# Patient Record
Sex: FEMALE | Race: WHITE | NOT HISPANIC OR LATINO
[De-identification: names, ages, dates, MRNs, and addresses within clinical notes are randomized per-mention and may not be internally consistent; named-entity substitution may affect disease eponyms.]

---

## 2017-03-16 ENCOUNTER — APPOINTMENT (OUTPATIENT)
Dept: SURGERY | Facility: CLINIC | Age: 82
End: 2017-03-16

## 2017-07-06 ENCOUNTER — APPOINTMENT (OUTPATIENT)
Dept: GASTROENTEROLOGY | Facility: CLINIC | Age: 82
End: 2017-07-06

## 2017-07-07 ENCOUNTER — OTHER (OUTPATIENT)
Age: 82
End: 2017-07-07

## 2017-08-15 ENCOUNTER — APPOINTMENT (OUTPATIENT)
Dept: SURGICAL ONCOLOGY | Facility: CLINIC | Age: 82
End: 2017-08-15
Payer: MEDICARE

## 2017-08-15 VITALS
DIASTOLIC BLOOD PRESSURE: 76 MMHG | OXYGEN SATURATION: 100 % | HEART RATE: 66 BPM | HEIGHT: 60 IN | SYSTOLIC BLOOD PRESSURE: 169 MMHG | RESPIRATION RATE: 15 BRPM | BODY MASS INDEX: 20.03 KG/M2 | WEIGHT: 102 LBS

## 2017-08-15 PROCEDURE — 99214 OFFICE O/P EST MOD 30 MIN: CPT

## 2017-09-26 ENCOUNTER — APPOINTMENT (OUTPATIENT)
Dept: SURGERY | Facility: CLINIC | Age: 82
End: 2017-09-26
Payer: MEDICARE

## 2017-09-26 PROCEDURE — 99213 OFFICE O/P EST LOW 20 MIN: CPT

## 2017-10-02 ENCOUNTER — RESULT REVIEW (OUTPATIENT)
Age: 82
End: 2017-10-02

## 2018-03-22 ENCOUNTER — OTHER (OUTPATIENT)
Age: 83
End: 2018-03-22

## 2018-05-08 ENCOUNTER — APPOINTMENT (OUTPATIENT)
Dept: SURGERY | Facility: CLINIC | Age: 83
End: 2018-05-08
Payer: MEDICARE

## 2018-05-08 PROCEDURE — 99213 OFFICE O/P EST LOW 20 MIN: CPT

## 2018-06-18 ENCOUNTER — RESULT REVIEW (OUTPATIENT)
Age: 83
End: 2018-06-18

## 2018-07-10 ENCOUNTER — APPOINTMENT (OUTPATIENT)
Dept: GASTROENTEROLOGY | Facility: CLINIC | Age: 83
End: 2018-07-10
Payer: MEDICARE

## 2018-07-10 VITALS
DIASTOLIC BLOOD PRESSURE: 49 MMHG | BODY MASS INDEX: 20.03 KG/M2 | WEIGHT: 102 LBS | HEART RATE: 47 BPM | SYSTOLIC BLOOD PRESSURE: 132 MMHG | HEIGHT: 60 IN

## 2018-07-10 DIAGNOSIS — Z87.19 PERSONAL HISTORY OF OTHER DISEASES OF THE DIGESTIVE SYSTEM: ICD-10-CM

## 2018-07-10 DIAGNOSIS — M19.90 UNSPECIFIED OSTEOARTHRITIS, UNSPECIFIED SITE: ICD-10-CM

## 2018-07-10 DIAGNOSIS — E78.00 PURE HYPERCHOLESTEROLEMIA, UNSPECIFIED: ICD-10-CM

## 2018-07-10 DIAGNOSIS — R73.03 PREDIABETES.: ICD-10-CM

## 2018-07-10 PROCEDURE — 82274 ASSAY TEST FOR BLOOD FECAL: CPT | Mod: QW

## 2018-07-10 PROCEDURE — 99214 OFFICE O/P EST MOD 30 MIN: CPT

## 2018-07-10 RX ORDER — ROSUVASTATIN CALCIUM 10 MG/1
10 TABLET, FILM COATED ORAL
Refills: 0 | Status: ACTIVE | COMMUNITY

## 2018-07-17 ENCOUNTER — RESULT REVIEW (OUTPATIENT)
Age: 83
End: 2018-07-17

## 2018-08-21 ENCOUNTER — APPOINTMENT (OUTPATIENT)
Dept: SURGICAL ONCOLOGY | Facility: CLINIC | Age: 83
End: 2018-08-21
Payer: MEDICARE

## 2018-08-21 VITALS
BODY MASS INDEX: 20.03 KG/M2 | OXYGEN SATURATION: 97 % | DIASTOLIC BLOOD PRESSURE: 82 MMHG | HEIGHT: 60 IN | WEIGHT: 102 LBS | SYSTOLIC BLOOD PRESSURE: 159 MMHG | RESPIRATION RATE: 16 BRPM | HEART RATE: 58 BPM

## 2018-08-21 PROCEDURE — 99213 OFFICE O/P EST LOW 20 MIN: CPT

## 2018-09-20 ENCOUNTER — APPOINTMENT (OUTPATIENT)
Dept: OTOLARYNGOLOGY | Facility: CLINIC | Age: 83
End: 2018-09-20
Payer: MEDICARE

## 2018-09-20 VITALS
WEIGHT: 102 LBS | HEART RATE: 54 BPM | BODY MASS INDEX: 20.03 KG/M2 | HEIGHT: 60 IN | SYSTOLIC BLOOD PRESSURE: 152 MMHG | DIASTOLIC BLOOD PRESSURE: 79 MMHG

## 2018-09-20 DIAGNOSIS — R68.89 OTHER GENERAL SYMPTOMS AND SIGNS: ICD-10-CM

## 2018-09-20 PROCEDURE — 99214 OFFICE O/P EST MOD 30 MIN: CPT | Mod: 25

## 2018-09-20 PROCEDURE — 31575 DIAGNOSTIC LARYNGOSCOPY: CPT

## 2018-10-01 ENCOUNTER — RX CHANGE (OUTPATIENT)
Age: 83
End: 2018-10-01

## 2018-10-10 ENCOUNTER — NON-APPOINTMENT (OUTPATIENT)
Age: 83
End: 2018-10-10

## 2018-10-10 ENCOUNTER — APPOINTMENT (OUTPATIENT)
Dept: ELECTROPHYSIOLOGY | Facility: CLINIC | Age: 83
End: 2018-10-10
Payer: MEDICARE

## 2018-10-10 VITALS
HEIGHT: 60 IN | DIASTOLIC BLOOD PRESSURE: 68 MMHG | OXYGEN SATURATION: 99 % | BODY MASS INDEX: 20.03 KG/M2 | HEART RATE: 65 BPM | WEIGHT: 102 LBS | SYSTOLIC BLOOD PRESSURE: 149 MMHG

## 2018-10-10 DIAGNOSIS — I10 ESSENTIAL (PRIMARY) HYPERTENSION: ICD-10-CM

## 2018-10-10 PROCEDURE — 93000 ELECTROCARDIOGRAM COMPLETE: CPT

## 2018-10-10 PROCEDURE — 99205 OFFICE O/P NEW HI 60 MIN: CPT

## 2018-10-10 RX ORDER — OMEPRAZOLE MAGNESIUM 20 MG/1
20 TABLET, DELAYED RELEASE ORAL
Qty: 30 | Refills: 3 | Status: DISCONTINUED | COMMUNITY
Start: 2018-09-20 | End: 2018-10-10

## 2018-10-10 RX ORDER — LOSARTAN POTASSIUM 50 MG/1
50 TABLET, FILM COATED ORAL DAILY
Qty: 30 | Refills: 0 | Status: ACTIVE | COMMUNITY

## 2018-10-10 RX ORDER — METHYLPREDNISOLONE 4 MG/1
4 TABLET ORAL
Qty: 1 | Refills: 0 | Status: DISCONTINUED | COMMUNITY
Start: 2018-09-20 | End: 2018-10-10

## 2018-10-18 ENCOUNTER — APPOINTMENT (OUTPATIENT)
Dept: OTOLARYNGOLOGY | Facility: CLINIC | Age: 83
End: 2018-10-18
Payer: MEDICARE

## 2018-10-18 VITALS
DIASTOLIC BLOOD PRESSURE: 71 MMHG | WEIGHT: 102 LBS | HEIGHT: 60 IN | BODY MASS INDEX: 20.03 KG/M2 | SYSTOLIC BLOOD PRESSURE: 142 MMHG | HEART RATE: 66 BPM

## 2018-10-18 PROCEDURE — 69210 REMOVE IMPACTED EAR WAX UNI: CPT

## 2018-10-18 PROCEDURE — 31575 DIAGNOSTIC LARYNGOSCOPY: CPT

## 2018-10-18 PROCEDURE — 99214 OFFICE O/P EST MOD 30 MIN: CPT | Mod: 25

## 2018-10-19 ENCOUNTER — RESULT REVIEW (OUTPATIENT)
Age: 83
End: 2018-10-19

## 2018-10-23 ENCOUNTER — OUTPATIENT (OUTPATIENT)
Dept: OUTPATIENT SERVICES | Facility: HOSPITAL | Age: 83
LOS: 1 days | End: 2018-10-23
Payer: MEDICARE

## 2018-10-23 VITALS
HEIGHT: 60 IN | WEIGHT: 102.07 LBS | TEMPERATURE: 98 F | RESPIRATION RATE: 16 BRPM | OXYGEN SATURATION: 98 % | DIASTOLIC BLOOD PRESSURE: 63 MMHG | HEART RATE: 63 BPM | SYSTOLIC BLOOD PRESSURE: 143 MMHG

## 2018-10-23 DIAGNOSIS — Z87.19 PERSONAL HISTORY OF OTHER DISEASES OF THE DIGESTIVE SYSTEM: Chronic | ICD-10-CM

## 2018-10-23 DIAGNOSIS — I49.9 CARDIAC ARRHYTHMIA, UNSPECIFIED: ICD-10-CM

## 2018-10-23 DIAGNOSIS — I65.29 OCCLUSION AND STENOSIS OF UNSPECIFIED CAROTID ARTERY: Chronic | ICD-10-CM

## 2018-10-23 DIAGNOSIS — H26.40 UNSPECIFIED SECONDARY CATARACT: Chronic | ICD-10-CM

## 2018-10-23 DIAGNOSIS — K11.9 DISEASE OF SALIVARY GLAND, UNSPECIFIED: Chronic | ICD-10-CM

## 2018-10-23 PROCEDURE — 93005 ELECTROCARDIOGRAM TRACING: CPT

## 2018-10-23 PROCEDURE — 93010 ELECTROCARDIOGRAM REPORT: CPT

## 2018-10-23 PROCEDURE — 33282: CPT

## 2018-10-23 PROCEDURE — 99203 OFFICE O/P NEW LOW 30 MIN: CPT

## 2018-10-23 PROCEDURE — C1764: CPT

## 2018-10-23 RX ORDER — LANSOPRAZOLE 15 MG/1
1 CAPSULE, DELAYED RELEASE ORAL
Qty: 0 | Refills: 0 | COMMUNITY

## 2018-10-23 RX ORDER — ROSUVASTATIN CALCIUM 5 MG/1
1 TABLET ORAL
Qty: 0 | Refills: 0 | COMMUNITY

## 2018-10-23 RX ORDER — LOSARTAN POTASSIUM 100 MG/1
1 TABLET, FILM COATED ORAL
Qty: 0 | Refills: 0 | COMMUNITY

## 2018-10-23 RX ORDER — SODIUM CHLORIDE 9 MG/ML
3 INJECTION INTRAMUSCULAR; INTRAVENOUS; SUBCUTANEOUS EVERY 8 HOURS
Qty: 0 | Refills: 0 | Status: DISCONTINUED | OUTPATIENT
Start: 2018-10-23 | End: 2018-11-07

## 2018-10-23 NOTE — H&P CARDIOLOGY - PMH
Carotid stenosis  s/p CEA  Diabetes  pre DM  GERD (gastroesophageal reflux disease)    HLD (hyperlipidemia)    HTN (hypertension)    Parotid mass  surgery + RT  Ventricular arrhythmia

## 2018-10-23 NOTE — H&P CARDIOLOGY - HISTORY OF PRESENT ILLNESS
86 year old  female with PMH of carotid stenosis s/p CEA (right), Parotid adenocarcinoma s/p resection,  GERD, HTN, HLD, pre DM, and ventricular arrhythmia presents today for LOOP implant. 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 Evaluated by Dr Ramirez and  LOOP recommended. No CP, dizziness or syncope reported. + MALDONADO.

## 2018-10-23 NOTE — H&P CARDIOLOGY - FAMILY HISTORY
Sibling  Still living? Yes, Estimated age: Age Unknown  Family history of arrhythmia, Age at diagnosis: Age Unknown

## 2018-10-26 PROBLEM — E11.9 TYPE 2 DIABETES MELLITUS WITHOUT COMPLICATIONS: Chronic | Status: ACTIVE | Noted: 2018-10-23

## 2018-10-26 PROBLEM — K11.9 DISEASE OF SALIVARY GLAND, UNSPECIFIED: Chronic | Status: ACTIVE | Noted: 2018-10-23

## 2018-10-26 PROBLEM — I10 ESSENTIAL (PRIMARY) HYPERTENSION: Chronic | Status: ACTIVE | Noted: 2018-10-23

## 2018-10-26 PROBLEM — I65.29 OCCLUSION AND STENOSIS OF UNSPECIFIED CAROTID ARTERY: Chronic | Status: ACTIVE | Noted: 2018-10-23

## 2018-10-26 PROBLEM — E78.5 HYPERLIPIDEMIA, UNSPECIFIED: Chronic | Status: ACTIVE | Noted: 2018-10-23

## 2018-10-26 PROBLEM — K21.9 GASTRO-ESOPHAGEAL REFLUX DISEASE WITHOUT ESOPHAGITIS: Chronic | Status: ACTIVE | Noted: 2018-10-23

## 2018-10-26 PROBLEM — I49.9 CARDIAC ARRHYTHMIA, UNSPECIFIED: Chronic | Status: ACTIVE | Noted: 2018-10-23

## 2018-10-31 ENCOUNTER — APPOINTMENT (OUTPATIENT)
Dept: ELECTROPHYSIOLOGY | Facility: CLINIC | Age: 83
End: 2018-10-31
Payer: MEDICARE

## 2018-10-31 VITALS
DIASTOLIC BLOOD PRESSURE: 79 MMHG | HEART RATE: 55 BPM | WEIGHT: 100 LBS | OXYGEN SATURATION: 99 % | SYSTOLIC BLOOD PRESSURE: 126 MMHG | BODY MASS INDEX: 19.53 KG/M2

## 2018-10-31 DIAGNOSIS — I49.3 VENTRICULAR PREMATURE DEPOLARIZATION: ICD-10-CM

## 2018-10-31 DIAGNOSIS — Z98.890 OTHER SPECIFIED POSTPROCEDURAL STATES: ICD-10-CM

## 2018-10-31 PROCEDURE — 99024 POSTOP FOLLOW-UP VISIT: CPT

## 2018-11-12 PROBLEM — I10 HYPERTENSION, UNSPECIFIED TYPE: Status: ACTIVE | Noted: 2018-07-10

## 2018-11-16 ENCOUNTER — APPOINTMENT (OUTPATIENT)
Dept: GASTROENTEROLOGY | Facility: CLINIC | Age: 83
End: 2018-11-16
Payer: MEDICARE

## 2018-11-16 VITALS
HEIGHT: 60 IN | BODY MASS INDEX: 19.63 KG/M2 | WEIGHT: 100 LBS | DIASTOLIC BLOOD PRESSURE: 78 MMHG | SYSTOLIC BLOOD PRESSURE: 151 MMHG | HEART RATE: 61 BPM

## 2018-11-16 DIAGNOSIS — Z80.0 FAMILY HISTORY OF MALIGNANT NEOPLASM OF DIGESTIVE ORGANS: ICD-10-CM

## 2018-11-16 PROCEDURE — 99214 OFFICE O/P EST MOD 30 MIN: CPT

## 2018-11-16 RX ORDER — LANSOPRAZOLE 30 MG/1
30 CAPSULE, DELAYED RELEASE ORAL DAILY
Qty: 30 | Refills: 0 | Status: DISCONTINUED | COMMUNITY
Start: 2018-09-21 | End: 2018-11-16

## 2018-11-19 ENCOUNTER — APPOINTMENT (OUTPATIENT)
Dept: GASTROENTEROLOGY | Facility: CLINIC | Age: 83
End: 2018-11-19

## 2018-12-12 ENCOUNTER — APPOINTMENT (OUTPATIENT)
Dept: GASTROENTEROLOGY | Facility: CLINIC | Age: 83
End: 2018-12-12

## 2018-12-17 ENCOUNTER — APPOINTMENT (OUTPATIENT)
Dept: ELECTROPHYSIOLOGY | Facility: CLINIC | Age: 83
End: 2018-12-17

## 2019-01-22 ENCOUNTER — APPOINTMENT (OUTPATIENT)
Dept: SURGERY | Facility: CLINIC | Age: 84
End: 2019-01-22
Payer: MEDICARE

## 2019-01-22 PROCEDURE — 99213 OFFICE O/P EST LOW 20 MIN: CPT

## 2019-01-22 NOTE — HISTORY OF PRESENT ILLNESS
[de-identified] : 16  years s/p right parotidectomy for adenocarcinoma with postop RT. denies pain, bleeding, dysphagia, hoarseness or new lesions.    recovered from right  surgery.  last CXR normal

## 2019-01-22 NOTE — ASSESSMENT
[FreeTextEntry1] : will observe.  to return earlier if any change.  no indication for any studies at this time

## 2019-01-22 NOTE — PHYSICAL EXAM
[de-identified] : no palpable thyroid nodules [Laryngoscopy Performed] : laryngoscopy was performed, see procedure section for findings [Midline] : located in midline position [Normal] : orientation to person, place, and time: normal [de-identified] : no palpable parotid bed nodules

## 2019-02-01 ENCOUNTER — APPOINTMENT (OUTPATIENT)
Dept: OTOLARYNGOLOGY | Facility: CLINIC | Age: 84
End: 2019-02-01
Payer: MEDICARE

## 2019-02-01 VITALS
BODY MASS INDEX: 19.63 KG/M2 | DIASTOLIC BLOOD PRESSURE: 63 MMHG | SYSTOLIC BLOOD PRESSURE: 153 MMHG | HEIGHT: 60 IN | WEIGHT: 100 LBS | HEART RATE: 42 BPM

## 2019-02-01 DIAGNOSIS — H90.3 SENSORINEURAL HEARING LOSS, BILATERAL: ICD-10-CM

## 2019-02-01 DIAGNOSIS — H61.23 IMPACTED CERUMEN, BILATERAL: ICD-10-CM

## 2019-02-01 DIAGNOSIS — M26.609 UNSPECIFIED TEMPOROMANDIBULAR JOINT DISORDER: ICD-10-CM

## 2019-02-01 PROCEDURE — 99214 OFFICE O/P EST MOD 30 MIN: CPT | Mod: 25

## 2019-02-01 PROCEDURE — 31575 DIAGNOSTIC LARYNGOSCOPY: CPT

## 2019-02-01 PROCEDURE — 69210 REMOVE IMPACTED EAR WAX UNI: CPT

## 2019-02-01 NOTE — REASON FOR VISIT
[Subsequent Evaluation] : a subsequent evaluation for [Hoarseness/Dysphonia] : hoarseness [Spouse] : spouse [FreeTextEntry2] : hoarseness

## 2019-02-01 NOTE — ASSESSMENT
[FreeTextEntry1] :  GERD\par anti-reflux  diet\par \par After informed verbal consent is obtained, cerumen is removed from the right and left  ear canal with a curette and suction.\par Rec: \par Debrox was prescribed and  is to be placed in both ears on a routine basis to keep them free of wax.\par Routine debridement suggested to keep the ears free of wax.\par

## 2019-02-01 NOTE — PHYSICAL EXAM
[Midline] : trachea located in midline position [Normal] : no rashes [Laryngoscopy Performed] : laryngoscopy was performed, see procedure section for findings [de-identified] : bilat wax

## 2019-02-01 NOTE — HISTORY OF PRESENT ILLNESS
[Anxiety] : anxiety [Hearing Loss] : hearing loss [Presbycusis] : presbycusis [None] : No associated symptoms are reported. [de-identified] : 85 yo female\par Patient has been doing the relfux medication  and now presents for f/u\par voice now ok\par h/o ear wax amd moderate hearing loss x yrs [de-identified] : parotid surg [Tinnitus] : no tinnitus [Vertigo] : no vertigo [Dizziness] : no dizziness [Headache] : no headache [Loud Noise Exposure] : no history of loud noise exposure [Smoking] : no smoking [Early Onset Hearing Loss] : no early onset hearing loss [Stroke] : no stroke [Facial Pain] : no facial pain [Facial Pressure] : no facial pressure [Nasal Congestion] : no nasal congestion [Diplopia] : no diplopia [Ear Fullness] : no ear fullness [Allergic Rhinitis] : no allergic rhinitis [Environmental Allergies] : no environmental allergies [Seasonal Allergies] : no seasonal allergies [Adenoidectomy] : no adenoidectomy [Allergies] : no allergies [Asthma] : no asthma [Neck Mass] : no neck mass [Neck Pain] : no neck pain [Chills] : no chills [Cold Intolerance] : no cold intolerance [Cough] : no cough [Fatigue] : no fatigue [Heat Intolerance] : no heat intolerance

## 2019-05-07 ENCOUNTER — APPOINTMENT (OUTPATIENT)
Dept: ELECTROPHYSIOLOGY | Facility: CLINIC | Age: 84
End: 2019-05-07

## 2019-07-11 ENCOUNTER — APPOINTMENT (OUTPATIENT)
Dept: GASTROENTEROLOGY | Facility: CLINIC | Age: 84
End: 2019-07-11
Payer: MEDICARE

## 2019-07-11 VITALS
BODY MASS INDEX: 17.87 KG/M2 | WEIGHT: 91 LBS | HEART RATE: 52 BPM | SYSTOLIC BLOOD PRESSURE: 102 MMHG | HEIGHT: 60 IN | DIASTOLIC BLOOD PRESSURE: 50 MMHG

## 2019-07-11 DIAGNOSIS — E80.6 OTHER DISORDERS OF BILIRUBIN METABOLISM: ICD-10-CM

## 2019-07-11 DIAGNOSIS — M41.9 SCOLIOSIS, UNSPECIFIED: ICD-10-CM

## 2019-07-11 DIAGNOSIS — K21.9 GASTRO-ESOPHAGEAL REFLUX DISEASE W/OUT ESOPHAGITIS: ICD-10-CM

## 2019-07-11 PROCEDURE — 99214 OFFICE O/P EST MOD 30 MIN: CPT

## 2019-07-11 PROCEDURE — 82274 ASSAY TEST FOR BLOOD FECAL: CPT | Mod: QW

## 2019-07-11 RX ORDER — LANSOPRAZOLE 30 MG/1
30 CAPSULE, DELAYED RELEASE ORAL DAILY
Qty: 90 | Refills: 3 | Status: DISCONTINUED | COMMUNITY
Start: 2018-10-18 | End: 2019-07-11

## 2019-07-11 RX ORDER — LANSOPRAZOLE 30 MG/1
30 CAPSULE, DELAYED RELEASE ORAL
Qty: 180 | Refills: 3 | Status: DISCONTINUED | COMMUNITY
Start: 2018-11-16 | End: 2019-07-11

## 2019-07-11 NOTE — ASSESSMENT
[FreeTextEntry1] : 1. History of GERD, laryngopharyngeal reflux, now quiescent after PPI; mild reflux esophagitis, hiatal hernia, inactive gastritis at last EGD 2010; family history of stomach cancer (brother).\par 2. Tortuous colon, diverticulosis, hemorrhoids at last colonoscopy May 2010. Stool guaiac negative with negative FIT on today's exam.\par 3. Status post parotidectomy for adenocarcinoma, with post-op radiation.\par 4. Hyperbilirubinemia secondary to Gilbert's.\par 5. Arrhythmia; s/p loop insertion. Exertional dyspnea. \par 6. Prediabetes.\par 7. Osteoarthritis, DJD of spine with scoliosis.\par 8. Macular degeneration; status post right iridectomy.\par 9. Status post right carotid endarterectomy.\par 10. Hypertension.\par 11. Hypercholesterolemia.\par 12. Status post right inguinal herniorrhaphy, T&A.\par 13. Allergic to penicillin.\par \par Plan:\par 1. Recent labs reviewed.\par 2. No plans for repeat scopes at this time.\par 3. Return here in 1 year or as needed.\par

## 2019-07-11 NOTE — PHYSICAL EXAM
[General Appearance - Alert] : alert [General Appearance - In No Acute Distress] : in no acute distress [Sclera] : the sclera and conjunctiva were normal [Outer Ear] : the ears and nose were normal in appearance [Neck Appearance] : the appearance of the neck was normal [Neck Cervical Mass (___cm)] : no neck mass was observed [Thyroid Diffuse Enlargement] : the thyroid was not enlarged [Jugular Venous Distention Increased] : there was no jugular-venous distention [Thyroid Nodule] : there were no palpable thyroid nodules [Auscultation Breath Sounds / Voice Sounds] : lungs were clear to auscultation bilaterally [Heart Sounds] : normal S1 and S2 [Heart Sounds Gallop] : no gallops [Heart Sounds Pericardial Friction Rub] : no pericardial rub [Edema] : there was no peripheral edema [Full Pulse] : the pedal pulses are present [Bowel Sounds] : normal bowel sounds [Abdomen Soft] : soft [Abdomen Tenderness] : non-tender [Abdomen Mass (___ Cm)] : no abdominal mass palpated [Abdomen Hernia] : no hernia was discovered [No Rectal Mass] : no rectal mass [Normal Sphincter Tone] : normal sphincter tone [Cervical Lymph Nodes Enlarged Posterior Bilaterally] : posterior cervical [Cervical Lymph Nodes Enlarged Anterior Bilaterally] : anterior cervical [Supraclavicular Lymph Nodes Enlarged Bilaterally] : supraclavicular [Axillary Lymph Nodes Enlarged Bilaterally] : axillary [Femoral Lymph Nodes Enlarged Bilaterally] : femoral [Inguinal Lymph Nodes Enlarged Bilaterally] : inguinal [Abnormal Walk] : normal gait [Nail Clubbing] : no clubbing  or cyanosis of the fingernails [Musculoskeletal - Swelling] : no joint swelling seen [Motor Tone] : muscle strength and tone were normal [Skin Turgor] : normal skin turgor [Skin Color & Pigmentation] : normal skin color and pigmentation [] : no rash [Oriented To Time, Place, And Person] : oriented to person, place, and time [Impaired Insight] : insight and judgment were intact [Affect] : the affect was normal [Internal Hemorrhoid] : no internal hemorrhoids [External Hemorrhoid] : no external hemorrhoids [Occult Blood Positive] : stool was negative for occult blood [FreeTextEntry1] : scoliosis

## 2019-07-11 NOTE — CONSULT LETTER
[Dear  ___] : Dear  [unfilled], [Courtesy Letter:] : I had the pleasure of seeing your patient, [unfilled], in my office today. [Please see my note below.] : Please see my note below. [Consult Closing:] : Thank you very much for allowing me to participate in the care of this patient.  If you have any questions, please do not hesitate to contact me. [Sincerely,] : Sincerely, [FreeTextEntry3] : Ld Henry M.D.\par

## 2019-07-11 NOTE — HISTORY OF PRESENT ILLNESS
[FreeTextEntry1] : Rneetta was diagnosed with laryngopharyngeal reflux (evaluated by Dr. Brennan), took lansoprazole for a while, and now has virtually no GI symptoms, no longer taking a PPI. On only one occasion after ingesting sweets did she have any heartburn. Recent labs revealed elevated bilirubin.

## 2019-08-22 ENCOUNTER — APPOINTMENT (OUTPATIENT)
Dept: SURGICAL ONCOLOGY | Facility: CLINIC | Age: 84
End: 2019-08-22
Payer: MEDICARE

## 2019-08-22 VITALS
HEART RATE: 83 BPM | SYSTOLIC BLOOD PRESSURE: 155 MMHG | OXYGEN SATURATION: 92 % | WEIGHT: 16 LBS | DIASTOLIC BLOOD PRESSURE: 89 MMHG | BODY MASS INDEX: 3.14 KG/M2 | HEIGHT: 60 IN | RESPIRATION RATE: 16 BRPM

## 2019-08-22 PROCEDURE — 99213 OFFICE O/P EST LOW 20 MIN: CPT

## 2019-08-22 NOTE — HISTORY OF PRESENT ILLNESS
[de-identified] : 88 y/o female presents for annual breast exam. Patient also has a history of adenocarcinoma of the parotid.\par \par Today the pt was without any complaints. Denies palpable breast masses, nipple discharge, skin changes, inversion or breast pain. Denies constitutional symptoms\par \par MMG/US 07/15/19: No significant masses, calcifications, or other findings are seen in either breast. There are postoperative findings in both breasts. A loop recorder is present in the medial LT breast. \par BIRADS 1.

## 2019-08-22 NOTE — ASSESSMENT
[FreeTextEntry1] : Imp:\par No evidence of new or recurrent lesions. Breast imaging failed to identify any suspicious lesions. No suspicious lesions on exam\par \par \par Plan:\par Continue yearly surveillance\par \par \par

## 2019-08-22 NOTE — PHYSICAL EXAM
[Normal] : supple, no neck mass and thyroid not enlarged [Normal Neck Lymph Nodes] : normal neck lymph nodes  [Normal Groin Lymph Nodes] : normal groin lymph nodes [Normal] : oriented to person, place and time, with appropriate affect [FreeTextEntry1] : I, Riley Crowell was present for the physical exam\par \par \par \par  [de-identified] : Complete normal breast examination performed supine and upright revealed no palpable masses, nipple discharge, inversion, deviation or enlarge axillary lymph nodes, or supraclavicular lymph nodes [de-identified] : Normal S1, S2. Regular rate and rhythm\par \par  [de-identified] : Clear breath sounds bilaterally, normal respiratory effort\par \par

## 2019-09-12 ENCOUNTER — APPOINTMENT (OUTPATIENT)
Dept: SURGERY | Facility: CLINIC | Age: 84
End: 2019-09-12
Payer: MEDICARE

## 2019-09-12 PROCEDURE — 99213 OFFICE O/P EST LOW 20 MIN: CPT

## 2019-09-12 NOTE — PHYSICAL EXAM
[de-identified] : no palpable thyroid nodules [de-identified] : well healed scar [Laryngoscopy Performed] : laryngoscopy was performed, see procedure section for findings [Midline] : located in midline position [de-identified] : no palpable parotid bed nodules.   well healed scar [Normal] : orientation to person, place, and time: normal

## 2019-09-12 NOTE — HISTORY OF PRESENT ILLNESS
[de-identified] : 17  years s/p right parotidectomy for adenocarcinoma with postop RT. denies pain, bleeding, dysphagia, hoarseness or new lesions.     last CXR normal.  no changes medically since last visit

## 2019-10-22 ENCOUNTER — RESULT REVIEW (OUTPATIENT)
Age: 84
End: 2019-10-22

## 2019-12-11 ENCOUNTER — APPOINTMENT (OUTPATIENT)
Dept: OTOLARYNGOLOGY | Facility: CLINIC | Age: 84
End: 2019-12-11

## 2020-05-08 ENCOUNTER — APPOINTMENT (OUTPATIENT)
Dept: OTOLARYNGOLOGY | Facility: CLINIC | Age: 85
End: 2020-05-08

## 2020-07-16 ENCOUNTER — APPOINTMENT (OUTPATIENT)
Dept: GASTROENTEROLOGY | Facility: CLINIC | Age: 85
End: 2020-07-16
Payer: MEDICARE

## 2020-07-16 VITALS
DIASTOLIC BLOOD PRESSURE: 66 MMHG | TEMPERATURE: 97.1 F | HEART RATE: 85 BPM | WEIGHT: 89 LBS | HEIGHT: 60 IN | BODY MASS INDEX: 17.47 KG/M2 | SYSTOLIC BLOOD PRESSURE: 160 MMHG

## 2020-07-16 DIAGNOSIS — Z87.898 PERSONAL HISTORY OF OTHER SPECIFIED CONDITIONS: ICD-10-CM

## 2020-07-16 DIAGNOSIS — E80.4 GILBERT SYNDROME: ICD-10-CM

## 2020-07-16 PROCEDURE — 82274 ASSAY TEST FOR BLOOD FECAL: CPT | Mod: QW

## 2020-07-16 PROCEDURE — 99214 OFFICE O/P EST MOD 30 MIN: CPT

## 2020-07-16 RX ORDER — LANSOPRAZOLE 30 MG/1
30 CAPSULE, DELAYED RELEASE ORAL TWICE DAILY
Qty: 180 | Refills: 1 | Status: DISCONTINUED | COMMUNITY
Start: 2020-02-18 | End: 2020-07-16

## 2020-07-16 NOTE — ASSESSMENT
[FreeTextEntry1] : 1. History of GERD, laryngopharyngeal reflux, asymptomatic off PPI; mild reflux esophagitis, hiatal hernia, inactive gastritis at last EGD 2010; family history of stomach cancer (brother).\par 2. Tortuous colon, diverticulosis, hemorrhoids at last colonoscopy May 2010. Stool guaiac negative with negative FIT on today's exam.\par 3. Status post parotidectomy for adenocarcinoma, with post-op radiation.\par 4. Hyperbilirubinemia secondary to Gilbert's.\par 5. Arrhythmia; s/p PPM & loop recorder insertion.  \par 6. Prediabetes.\par 7. Osteoarthritis, DJD of spine with scoliosis.\par 8. Macular degeneration; status post right iridectomy.\par 9. Status post right carotid endarterectomy.\par 10. Hypertension.\par 11. Hypercholesterolemia.\par 12. Status post right inguinal herniorrhaphy, T&A.\par 13. Allergic to penicillin.\par \par Plan:\par 1. Dr. Sigala to forward latest labs for my review.\par 2. Return here in 1 year.\par 3. No plans for panendoscopy at this time.

## 2020-07-16 NOTE — HISTORY OF PRESENT ILLNESS
[FreeTextEntry1] : Renetta denies GI symptoms at this time, no longer taking PPI.  Since last visit, she underwent PPM insertion.

## 2020-07-16 NOTE — PHYSICAL EXAM
[General Appearance - Alert] : alert [General Appearance - In No Acute Distress] : in no acute distress [General Appearance - Well Developed] : well developed [Sclera] : the sclera and conjunctiva were normal [Neck Cervical Mass (___cm)] : no neck mass was observed [Jugular Venous Distention Increased] : there was no jugular-venous distention [Neck Appearance] : the appearance of the neck was normal [Thyroid Nodule] : there were no palpable thyroid nodules [Thyroid Diffuse Enlargement] : the thyroid was not enlarged [Auscultation Breath Sounds / Voice Sounds] : lungs were clear to auscultation bilaterally [Heart Sounds Gallop] : no gallops [Heart Sounds] : normal S1 and S2 [Heart Sounds Pericardial Friction Rub] : no pericardial rub [Full Pulse] : the pedal pulses are present [Bowel Sounds] : normal bowel sounds [Edema] : there was no peripheral edema [Abdomen Soft] : soft [Abdomen Tenderness] : non-tender [Abdomen Mass (___ Cm)] : no abdominal mass palpated [Abdomen Hernia] : no hernia was discovered [No Rectal Mass] : no rectal mass [Normal Sphincter Tone] : normal sphincter tone [Cervical Lymph Nodes Enlarged Posterior Bilaterally] : posterior cervical [Cervical Lymph Nodes Enlarged Anterior Bilaterally] : anterior cervical [Femoral Lymph Nodes Enlarged Bilaterally] : femoral [Inguinal Lymph Nodes Enlarged Bilaterally] : inguinal [Supraclavicular Lymph Nodes Enlarged Bilaterally] : supraclavicular [Axillary Lymph Nodes Enlarged Bilaterally] : axillary [Nail Clubbing] : no clubbing  or cyanosis of the fingernails [Musculoskeletal - Swelling] : no joint swelling seen [Skin Color & Pigmentation] : normal skin color and pigmentation [Skin Turgor] : normal skin turgor [Oriented To Time, Place, And Person] : oriented to person, place, and time [] : no rash [Affect] : the affect was normal [Impaired Insight] : insight and judgment were intact [Internal Hemorrhoid] : no internal hemorrhoids [External Hemorrhoid] : no external hemorrhoids [Occult Blood Positive] : stool was negative for occult blood [FreeTextEntry1] : scoliosis

## 2020-07-27 ENCOUNTER — APPOINTMENT (OUTPATIENT)
Dept: OTOLARYNGOLOGY | Facility: CLINIC | Age: 85
End: 2020-07-27

## 2020-09-22 ENCOUNTER — APPOINTMENT (OUTPATIENT)
Dept: SURGICAL ONCOLOGY | Facility: CLINIC | Age: 85
End: 2020-09-22
Payer: MEDICARE

## 2020-09-22 VITALS
WEIGHT: 87 LBS | HEIGHT: 60 IN | SYSTOLIC BLOOD PRESSURE: 175 MMHG | HEART RATE: 66 BPM | OXYGEN SATURATION: 97 % | BODY MASS INDEX: 17.08 KG/M2 | DIASTOLIC BLOOD PRESSURE: 82 MMHG

## 2020-09-22 PROCEDURE — 99213 OFFICE O/P EST LOW 20 MIN: CPT

## 2020-09-22 NOTE — ADDENDUM
[FreeTextEntry1] : I, Candelario Muse, acted soley as a scribe for Dr. Lobo Ordaz on this date 09/22/2020. \par

## 2020-09-22 NOTE — PHYSICAL EXAM
[Normal] : supple, no neck mass and thyroid not enlarged [Normal Neck Lymph Nodes] : normal neck lymph nodes  [Normal Groin Lymph Nodes] : normal groin lymph nodes [Normal] : oriented to person, place and time, with appropriate affect [FreeTextEntry1] : I, Candelario Muse, acted soley as a scribe for Dr. Lobo Ordaz on this date 09/22/2020. \par \par  [de-identified] : Pacemaker upper chest wall.  [de-identified] : Complete normal breast examination performed supine and upright revealed no palpable masses, nipple discharge, inversion, deviation or enlarge axillary lymph nodes, or supraclavicular lymph nodes [de-identified] : Clear breath sounds bilaterally, normal respiratory effort\par \par

## 2020-09-22 NOTE — HISTORY OF PRESENT ILLNESS
[de-identified] : 87 y/o female presents for annual breast exam. Patient also has a history of adenocarcinoma of the parotid.\par \par Pt notes she had a pacemaker put in on Jan 21st 2020 with Dr. Farshad Michaud at [facility]. \par \par Today the pt was without any complaints. Denies palpable breast masses, nipple discharge, skin changes, inversion or breast pain. Denies constitutional symptoms\par \par Recent MMG/US from 7/14/20 showed limited evaluation of the posterior left breast due to difficulties in positioning. Otherwise there is no mammographic or sonographic evidence of malignancy. BIRADS-2

## 2020-10-01 ENCOUNTER — APPOINTMENT (OUTPATIENT)
Dept: SURGERY | Facility: CLINIC | Age: 85
End: 2020-10-01
Payer: MEDICARE

## 2020-10-01 PROCEDURE — 99213 OFFICE O/P EST LOW 20 MIN: CPT

## 2020-10-01 NOTE — PHYSICAL EXAM
[de-identified] : well healed scar [de-identified] : no palpable thyroid nodules [Laryngoscopy Performed] : laryngoscopy was performed, see procedure section for findings [Midline] : located in midline position [Normal] : orientation to person, place, and time: normal [de-identified] : no palpable parotid bed nodules.   well healed scar

## 2020-10-01 NOTE — HISTORY OF PRESENT ILLNESS
[de-identified] : 18  years s/p right parotidectomy for adenocarcinoma with postop RT. denies pain, bleeding, dysphagia, hoarseness or new lesions.     last CXR normal.  no changes medically since last visit

## 2020-10-22 ENCOUNTER — APPOINTMENT (OUTPATIENT)
Dept: SURGERY | Facility: CLINIC | Age: 85
End: 2020-10-22
Payer: MEDICARE

## 2020-10-22 VITALS
OXYGEN SATURATION: 96 % | SYSTOLIC BLOOD PRESSURE: 170 MMHG | HEART RATE: 70 BPM | HEIGHT: 59 IN | WEIGHT: 94 LBS | DIASTOLIC BLOOD PRESSURE: 84 MMHG | BODY MASS INDEX: 18.95 KG/M2 | RESPIRATION RATE: 17 BRPM | TEMPERATURE: 97.6 F

## 2020-10-22 DIAGNOSIS — R10.31 RIGHT LOWER QUADRANT PAIN: ICD-10-CM

## 2020-10-22 PROCEDURE — 99214 OFFICE O/P EST MOD 30 MIN: CPT

## 2020-10-22 PROCEDURE — 99203 OFFICE O/P NEW LOW 30 MIN: CPT

## 2020-10-22 NOTE — HISTORY OF PRESENT ILLNESS
[de-identified] : Renetta is an 87 y/o female here for evaluation of pain at surgical site. Patient is s/p right inguinal hernia repair on 12/7/16. Last seen on 12/20/16.\par The patient reports intermittent right groin pain over the past year. She denies bulging/swelling. She has not had any imaging studies done.

## 2020-10-22 NOTE — PHYSICAL EXAM
[JVD] : no jugular venous distention  [Normal Breath Sounds] : Normal breath sounds [Normal Heart Sounds] : normal heart sounds [Abdominal Masses] : No abdominal masses [Abdomen Tenderness] : ~T ~M No abdominal tenderness [No HSM] : no hepatosplenomegaly [No Rash or Lesion] : No rash or lesion [Alert] : alert [Oriented to Person] : oriented to person [Oriented to Place] : oriented to place [Oriented to Time] : oriented to time [Calm] : calm [de-identified] : Developed well-nourished thin white female in no acute distress [de-identified] : In normal limits [de-identified] : Examination of the right groin in the standing and supine position fails to reveal any documentable recurrent right inguinal hernia.

## 2020-10-22 NOTE — ASSESSMENT
[FreeTextEntry1] : I discussed with the patient that I cannot document any recurrent hernia.  I have told the patient I will send her for a dynamic ultrasound of the right groin to see if we can document any abnormality.

## 2021-04-13 ENCOUNTER — RESULT REVIEW (OUTPATIENT)
Age: 86
End: 2021-04-13

## 2021-07-29 ENCOUNTER — APPOINTMENT (OUTPATIENT)
Dept: GASTROENTEROLOGY | Facility: CLINIC | Age: 86
End: 2021-07-29
Payer: MEDICARE

## 2021-07-29 VITALS
HEIGHT: 59 IN | SYSTOLIC BLOOD PRESSURE: 107 MMHG | WEIGHT: 95 LBS | BODY MASS INDEX: 19.15 KG/M2 | DIASTOLIC BLOOD PRESSURE: 84 MMHG | HEART RATE: 58 BPM

## 2021-07-29 DIAGNOSIS — K57.30 DIVERTICULOSIS OF LARGE INTESTINE W/OUT PERFORATION OR ABSCESS W/OUT BLEEDING: ICD-10-CM

## 2021-07-29 PROCEDURE — 99214 OFFICE O/P EST MOD 30 MIN: CPT

## 2021-07-29 PROCEDURE — 82274 ASSAY TEST FOR BLOOD FECAL: CPT | Mod: QW

## 2021-07-29 RX ORDER — CHOLECALCIFEROL (VITAMIN D3) 25 MCG
TABLET ORAL
Refills: 0 | Status: ACTIVE | COMMUNITY

## 2021-07-29 NOTE — PHYSICAL EXAM
[General Appearance - Alert] : alert [General Appearance - In No Acute Distress] : in no acute distress [General Appearance - Well Developed] : well developed [Sclera] : the sclera and conjunctiva were normal [Neck Appearance] : the appearance of the neck was normal [Neck Cervical Mass (___cm)] : no neck mass was observed [Jugular Venous Distention Increased] : there was no jugular-venous distention [Thyroid Diffuse Enlargement] : the thyroid was not enlarged [Thyroid Nodule] : there were no palpable thyroid nodules [Auscultation Breath Sounds / Voice Sounds] : lungs were clear to auscultation bilaterally [Heart Sounds] : normal S1 and S2 [Heart Sounds Gallop] : no gallops [Heart Sounds Pericardial Friction Rub] : no pericardial rub [Full Pulse] : the pedal pulses are present [Edema] : there was no peripheral edema [Bowel Sounds] : normal bowel sounds [Abdomen Soft] : soft [Abdomen Tenderness] : non-tender [Abdomen Mass (___ Cm)] : no abdominal mass palpated [Abdomen Hernia] : no hernia was discovered [Normal Sphincter Tone] : normal sphincter tone [No Rectal Mass] : no rectal mass [Internal Hemorrhoid] : internal hemorrhoids [Cervical Lymph Nodes Enlarged Posterior Bilaterally] : posterior cervical [Cervical Lymph Nodes Enlarged Anterior Bilaterally] : anterior cervical [Supraclavicular Lymph Nodes Enlarged Bilaterally] : supraclavicular [Inguinal Lymph Nodes Enlarged Bilaterally] : inguinal [Nail Clubbing] : no clubbing  or cyanosis of the fingernails [Musculoskeletal - Swelling] : no joint swelling seen [Skin Color & Pigmentation] : normal skin color and pigmentation [Skin Turgor] : normal skin turgor [] : no rash [Oriented To Time, Place, And Person] : oriented to person, place, and time [Impaired Insight] : insight and judgment were intact [Affect] : the affect was normal [External Hemorrhoid] : no external hemorrhoids [Occult Blood Positive] : stool was negative for occult blood [FreeTextEntry1] : scoliosis

## 2021-07-29 NOTE — REVIEW OF SYSTEMS
[Abdominal Pain] : abdominal pain [Constipation] : constipation [Difficulty Walking] : difficulty walking [Negative] : Heme/Lymph [As Noted in HPI] : as noted in HPI [Heartburn] : heartburn [Vomiting] : no vomiting [Diarrhea] : no diarrhea [Melena] : no melena

## 2021-07-29 NOTE — CONSULT LETTER
[Dear  ___] : Dear  [unfilled], [Courtesy Letter:] : I had the pleasure of seeing your patient, [unfilled], in my office today. [Please see my note below.] : Please see my note below. [Consult Closing:] : Thank you very much for allowing me to participate in the care of this patient.  If you have any questions, please do not hesitate to contact me. [Sincerely,] : Sincerely, [FreeTextEntry3] : Ld Herny M.D.\par

## 2021-07-29 NOTE — ASSESSMENT
[FreeTextEntry1] : 1. Recent abdominal pain, gassiness, with constipation that improved with defecation; tortuous colon, diverticulosis, hemorrhoids at last colonoscopy May 2010.  Stool heme-negative with negative fecal immunochemical test on today's exam.\par 2. History of GERD, laryngopharyngeal reflux, asymptomatic off PPI; mild reflux esophagitis, hiatal hernia, inactive gastritis at last EGD 2010; family history of stomach cancer (brother).\par 3. Status post parotidectomy for adenocarcinoma, with post-op radiation.\par 4. Hyperbilirubinemia secondary to Gilbert's.\par 5. Arrhythmia; s/p PPM & loop recorder insertion.  \par 6. Prediabetes.\par 7. Osteoarthritis, DJD of spine with scoliosis.\par 8. Macular degeneration; status post right iridectomy.\par 9. Status post right carotid endarterectomy.\par 10. Hypertension.\par 11. Hypercholesterolemia.\par 12. Fibrocystic breast disease.\par 13. Status post right inguinal herniorrhaphy, T&A.\par 14. Allergic to penicillin.\par \par Plan:\par 1. Dr. Sigala to forward latest labs for my review.\par 2. Decrease fiber until "good BM."\par 3. Can try MOM today to see if symptoms fully mercedes. Senokot at bedtime would be another option.\par 4. No plans for repeat colonoscopy or imaging study at this time.\par 5. Continue lansoprazole as needed.\par 6. Return here in 1 year.

## 2021-09-28 ENCOUNTER — APPOINTMENT (OUTPATIENT)
Dept: SURGICAL ONCOLOGY | Facility: CLINIC | Age: 86
End: 2021-09-28
Payer: MEDICARE

## 2021-10-05 ENCOUNTER — APPOINTMENT (OUTPATIENT)
Dept: SURGERY | Facility: CLINIC | Age: 86
End: 2021-10-05
Payer: MEDICARE

## 2021-10-05 DIAGNOSIS — C07 MALIGNANT NEOPLASM OF PAROTID GLAND: ICD-10-CM

## 2021-10-05 PROCEDURE — 99214 OFFICE O/P EST MOD 30 MIN: CPT

## 2021-10-05 NOTE — PHYSICAL EXAM
[de-identified] : well healed scar [de-identified] : no palpable thyroid nodules [Laryngoscopy Performed] : laryngoscopy was performed, see procedure section for findings [Midline] : located in midline position [Normal] : orientation to person, place, and time: normal [de-identified] : no palpable parotid bed nodules.   well healed scar

## 2021-10-05 NOTE — HISTORY OF PRESENT ILLNESS
[de-identified] : 19  years s/p right parotidectomy for adenocarcinoma with postop RT. denies pain, bleeding, dysphagia, hoarseness or new lesions.     last CXR normal.  no changes medically since last visit.  I have reviewed all old and new data and available images.  Additional information was obtained from others present at the time of visit to ensure the completeness of the history\par

## 2021-10-05 NOTE — ASSESSMENT
[FreeTextEntry1] : will observe.  to return earlier if any change.  no indication for any studies at this time.  patient has been given the opportunity to ask questions, and all of the patient's questions have been answered to their satisfaction\par

## 2021-10-18 ENCOUNTER — APPOINTMENT (OUTPATIENT)
Dept: OTOLARYNGOLOGY | Facility: CLINIC | Age: 86
End: 2021-10-18

## 2021-10-19 ENCOUNTER — APPOINTMENT (OUTPATIENT)
Dept: SURGICAL ONCOLOGY | Facility: CLINIC | Age: 86
End: 2021-10-19
Payer: MEDICARE

## 2021-10-19 VITALS
WEIGHT: 95 LBS | HEIGHT: 59 IN | BODY MASS INDEX: 19.15 KG/M2 | DIASTOLIC BLOOD PRESSURE: 68 MMHG | RESPIRATION RATE: 16 BRPM | SYSTOLIC BLOOD PRESSURE: 110 MMHG | HEART RATE: 60 BPM

## 2021-10-19 DIAGNOSIS — N60.19 DIFFUSE CYSTIC MASTOPATHY OF UNSPECIFIED BREAST: ICD-10-CM

## 2021-10-19 PROCEDURE — 99214 OFFICE O/P EST MOD 30 MIN: CPT

## 2021-10-19 NOTE — HISTORY OF PRESENT ILLNESS
[de-identified] : 88 y/o female presents for annual breast exam. Patient also has a history of adenocarcinoma of the parotid.  She has a previous history of bilateral excisional breast biopsies with benign pathology.  She denies any family history of breast or ovarian cancer.\par \par Pt notes she had a pacemaker put in on Jan 21st 2020 with Dr. Farshad Michaud at [facility]. \par \par Today the pt was without any complaints. Denies palpable breast masses, nipple discharge, skin changes, inversion or breast pain. Denies constitutional symptoms\par \par Recent screening MMG/US from 7/15/21 showed no mammographic or sonographic evidence of malignancy. BIRADS-2

## 2021-10-19 NOTE — PHYSICAL EXAM
[Normal] : supple, no neck mass and thyroid not enlarged [Normal Neck Lymph Nodes] : normal neck lymph nodes  [Normal Groin Lymph Nodes] : normal groin lymph nodes [Normal] : oriented to person, place and time, with appropriate affect [FreeTextEntry1] : AB present during exam \par \par  [de-identified] : Pacemaker upper chest wall.  [de-identified] : Complete normal breast examination performed supine and upright revealed no palpable masses, nipple discharge, inversion, deviation or enlarge axillary lymph nodes, or supraclavicular lymph nodes [de-identified] : Clear breath sounds bilaterally, normal respiratory effort\par \par

## 2021-10-19 NOTE — ASSESSMENT
[FreeTextEntry1] : Imp:\par No evidence of new or recurrent lesions. No suspicious lesions on exam\par \par \par Plan:\par Continue yearly surveillance\par \par \par

## 2021-10-28 NOTE — REVIEW OF SYSTEMS
oriented to person, place, time and situation [Negative] : Heme/Lymph [As Noted in HPI] : as noted in HPI [FreeTextEntry6] : Pacemaker 1/2020

## 2021-12-01 ENCOUNTER — RX RENEWAL (OUTPATIENT)
Age: 86
End: 2021-12-01

## 2022-01-25 ENCOUNTER — NON-APPOINTMENT (OUTPATIENT)
Age: 87
End: 2022-01-25

## 2022-03-16 ENCOUNTER — NON-APPOINTMENT (OUTPATIENT)
Age: 87
End: 2022-03-16

## 2022-03-17 ENCOUNTER — NON-APPOINTMENT (OUTPATIENT)
Age: 87
End: 2022-03-17

## 2022-03-23 ENCOUNTER — NON-APPOINTMENT (OUTPATIENT)
Age: 87
End: 2022-03-23

## 2022-03-25 ENCOUNTER — NON-APPOINTMENT (OUTPATIENT)
Age: 87
End: 2022-03-25

## 2022-06-14 ENCOUNTER — APPOINTMENT (OUTPATIENT)
Dept: GASTROENTEROLOGY | Facility: CLINIC | Age: 87
End: 2022-06-14
Payer: MEDICARE

## 2022-06-14 VITALS
WEIGHT: 91.03 LBS | HEIGHT: 59 IN | SYSTOLIC BLOOD PRESSURE: 132 MMHG | BODY MASS INDEX: 18.35 KG/M2 | DIASTOLIC BLOOD PRESSURE: 66 MMHG | HEART RATE: 51 BPM

## 2022-06-14 DIAGNOSIS — Z12.11 ENCOUNTER FOR SCREENING FOR MALIGNANT NEOPLASM OF COLON: ICD-10-CM

## 2022-06-14 DIAGNOSIS — K21.00 GASTRO-ESOPHAGEAL REFLUX DISEASE WITH ESOPHAGITIS, WITHOUT BLEEDING: ICD-10-CM

## 2022-06-14 DIAGNOSIS — K59.00 CONSTIPATION, UNSPECIFIED: ICD-10-CM

## 2022-06-14 PROCEDURE — 82274 ASSAY TEST FOR BLOOD FECAL: CPT | Mod: QW

## 2022-06-14 PROCEDURE — 99214 OFFICE O/P EST MOD 30 MIN: CPT

## 2022-06-14 RX ORDER — LANSOPRAZOLE 30 MG/1
30 CAPSULE, DELAYED RELEASE ORAL DAILY
Qty: 90 | Refills: 3 | Status: ACTIVE | COMMUNITY
Start: 2020-10-19 | End: 1900-01-01

## 2022-06-14 RX ORDER — STANDARDIZED SENNA CONCENTRATE 8.6 MG/1
TABLET ORAL
Refills: 0 | Status: ACTIVE | COMMUNITY

## 2022-06-14 NOTE — PHYSICAL EXAM
[General Appearance - Alert] : alert [General Appearance - In No Acute Distress] : in no acute distress [General Appearance - Well Developed] : well developed [Sclera] : the sclera and conjunctiva were normal [Neck Appearance] : the appearance of the neck was normal [Neck Cervical Mass (___cm)] : no neck mass was observed [Jugular Venous Distention Increased] : there was no jugular-venous distention [Thyroid Diffuse Enlargement] : the thyroid was not enlarged [Thyroid Nodule] : there were no palpable thyroid nodules [Auscultation Breath Sounds / Voice Sounds] : lungs were clear to auscultation bilaterally [Heart Sounds] : normal S1 and S2 [Heart Sounds Gallop] : no gallops [Heart Sounds Pericardial Friction Rub] : no pericardial rub [Full Pulse] : the pedal pulses are present [Edema] : there was no peripheral edema [Abdomen Soft] : soft [Abdomen Tenderness] : non-tender [Abdomen Mass (___ Cm)] : no abdominal mass palpated [Abdomen Hernia] : no hernia was discovered [Hyperactive] : hyperactive [Normal Sphincter Tone] : normal sphincter tone [No Rectal Mass] : no rectal mass [Internal Hemorrhoid] : internal hemorrhoids [Cervical Lymph Nodes Enlarged Posterior Bilaterally] : posterior cervical [Cervical Lymph Nodes Enlarged Anterior Bilaterally] : anterior cervical [Supraclavicular Lymph Nodes Enlarged Bilaterally] : supraclavicular [Inguinal Lymph Nodes Enlarged Bilaterally] : inguinal [Nail Clubbing] : no clubbing  or cyanosis of the fingernails [Musculoskeletal - Swelling] : no joint swelling seen [Skin Color & Pigmentation] : normal skin color and pigmentation [Skin Turgor] : normal skin turgor [] : no rash [Oriented To Time, Place, And Person] : oriented to person, place, and time [Impaired Insight] : insight and judgment were intact [Affect] : the affect was normal [External Hemorrhoid] : no external hemorrhoids [Occult Blood Positive] : stool was negative for occult blood [FreeTextEntry1] : unsteady on feet

## 2022-06-14 NOTE — ASSESSMENT
[FreeTextEntry1] : 1.  History of constipation, well controlled with Senokot; tortuous colon, diverticulosis, hemorrhoids at last colonoscopy May 2010.  Hyperactive normal pitch bowel sounds, with guaiac negative stool and negative FIT on today's exam.  \par 2. History of GERD, laryngopharyngeal reflux, without alarm symptoms taking PPI sporadically; mild reflux esophagitis, hiatal hernia, inactive gastritis at last EGD 2010; family history of stomach cancer (brother).\par 3. Status post parotidectomy for adenocarcinoma, with post-op radiation.\par 4. Hyperbilirubinemia secondary to Gilbert's.\par 5. Arrhythmia; s/p PPM & loop recorder insertion.  \par 6. Prediabetes.\par 7. Osteoarthritis, DJD of spine with scoliosis.\par 8. Macular degeneration; status post right iridectomy.\par 9. Status post right carotid endarterectomy.\par 10. Hypertension.\par 11. Hypercholesterolemia.\par 12. Fibrocystic breast disease.\par 13. Status post right inguinal herniorrhaphy, T&A.\par 14. Allergic to penicillin.\par \par Plan:\par 1.  Dr. Sigala to forward latest labs for my review.\par 2.  Continue Senokot regularly and PPI on demand.\par 3.  No plans for endoscopic evaluation at this time.\par 4.  Return here in 1 year.

## 2022-06-14 NOTE — HISTORY OF PRESENT ILLNESS
[FreeTextEntry1] : Bowels have definitely improved taking Senokot 2 at bedtime.  Renetta has been taking lansoprazole 30 mg several times per week for reflux.  She mentioned that if she eats too fast, she is more likely to have swallowing issues, which spontaneously mercedes.  Recent bloodwork was reportedly normal.

## 2022-06-15 ENCOUNTER — APPOINTMENT (OUTPATIENT)
Dept: OBGYN | Facility: CLINIC | Age: 87
End: 2022-06-15
Payer: MEDICARE

## 2022-06-15 PROCEDURE — 82270 OCCULT BLOOD FECES: CPT

## 2022-06-15 PROCEDURE — 81002 URINALYSIS NONAUTO W/O SCOPE: CPT

## 2022-06-15 PROCEDURE — G0101: CPT

## 2022-06-15 PROCEDURE — 99213 OFFICE O/P EST LOW 20 MIN: CPT | Mod: 25

## 2022-11-05 NOTE — HISTORY OF PRESENT ILLNESS
Patient reports feeling better when called to be roomed. Provider notified.   [FreeTextEntry1] : For the past two days, Renetta has felt a bit constipated, with intermittent vague left-sided discomfort, gassiness, and nausea, but symptoms improved with defecation.  She had seen Dr. James Poe last October for some discomfort at the [facility] incision site, not felt to have recurrent hernia.  She has been taking lansoprazole 2-3 times per week as needed for reflux.  Recent labs were reportedly unremarkable.

## 2024-09-13 ENCOUNTER — NEW PATIENT (OUTPATIENT)
Dept: URBAN - METROPOLITAN AREA SURGICAL CENTER 72 | Facility: SURGICAL CENTER | Age: 88
End: 2024-09-13

## 2024-09-13 DIAGNOSIS — H35.3114: ICD-10-CM

## 2024-09-13 DIAGNOSIS — H40.89: ICD-10-CM

## 2024-09-13 DIAGNOSIS — H40.051: ICD-10-CM

## 2024-09-13 PROCEDURE — 76514 ECHO EXAM OF EYE THICKNESS: CPT

## 2024-09-13 PROCEDURE — 99204 OFFICE O/P NEW MOD 45 MIN: CPT

## 2024-09-13 ASSESSMENT — TONOMETRY
OS_IOP_MMHG: 28
OD_IOP_MMHG: 24
OD_IOP_MMHG: 20

## 2024-09-13 ASSESSMENT — PACHYMETRY
OD_CT_UM: 489
OS_CT_UM: 519

## 2024-09-13 ASSESSMENT — VISUAL ACUITY: OD_SC: 20/400

## 2025-02-28 ENCOUNTER — IOP CHECK (OUTPATIENT)
Dept: URBAN - METROPOLITAN AREA SURGICAL CENTER 72 | Facility: SURGICAL CENTER | Age: 89
End: 2025-02-28

## 2025-02-28 DIAGNOSIS — H40.051: ICD-10-CM

## 2025-02-28 DIAGNOSIS — H35.3114: ICD-10-CM

## 2025-02-28 DIAGNOSIS — H40.89: ICD-10-CM

## 2025-02-28 PROCEDURE — 99213 OFFICE O/P EST LOW 20 MIN: CPT

## 2025-02-28 ASSESSMENT — TONOMETRY
OD_IOP_MMHG: 13
OS_IOP_MMHG: 33
OD_IOP_MMHG: 15

## 2025-02-28 ASSESSMENT — VISUAL ACUITY
OD_SC: 20/400-1
OD_CC: 20/400